# Patient Record
Sex: FEMALE | URBAN - METROPOLITAN AREA
[De-identification: names, ages, dates, MRNs, and addresses within clinical notes are randomized per-mention and may not be internally consistent; named-entity substitution may affect disease eponyms.]

---

## 2018-10-19 ENCOUNTER — APPOINTMENT (RX ONLY)
Dept: URBAN - METROPOLITAN AREA CLINIC 156 | Facility: CLINIC | Age: 28
Setting detail: DERMATOLOGY
End: 2018-10-19

## 2018-10-19 DIAGNOSIS — B35.4 TINEA CORPORIS: ICD-10-CM

## 2018-10-19 DIAGNOSIS — L663 OTHER SPECIFIED DISEASES OF HAIR AND HAIR FOLLICLES: ICD-10-CM | Status: RESOLVING

## 2018-10-19 DIAGNOSIS — L738 OTHER SPECIFIED DISEASES OF HAIR AND HAIR FOLLICLES: ICD-10-CM | Status: RESOLVING

## 2018-10-19 DIAGNOSIS — L73.9 FOLLICULAR DISORDER, UNSPECIFIED: ICD-10-CM | Status: RESOLVING

## 2018-10-19 PROBLEM — L02.426 FURUNCLE OF LEFT LOWER LIMB: Status: ACTIVE | Noted: 2018-10-19

## 2018-10-19 PROCEDURE — ? COUNSELING

## 2018-10-19 PROCEDURE — 87220 TISSUE EXAM FOR FUNGI: CPT

## 2018-10-19 PROCEDURE — ? KOH PREP

## 2018-10-19 PROCEDURE — ? OTHER

## 2018-10-19 PROCEDURE — ? PRESCRIPTION

## 2018-10-19 PROCEDURE — 99202 OFFICE O/P NEW SF 15 MIN: CPT

## 2018-10-19 RX ORDER — NAFTIFINE HYDROCHLORIDE 10 MG/2G
CREAM TOPICAL ONCE A DAY
Qty: 1 | Refills: 1 | Status: ERX | COMMUNITY
Start: 2018-10-19

## 2018-10-19 RX ADMIN — NAFTIFINE HYDROCHLORIDE: 10 CREAM TOPICAL at 21:18

## 2018-10-19 ASSESSMENT — LOCATION SIMPLE DESCRIPTION DERM
LOCATION SIMPLE: LEFT FOREARM
LOCATION SIMPLE: LEFT POSTERIOR THIGH

## 2018-10-19 ASSESSMENT — LOCATION DETAILED DESCRIPTION DERM
LOCATION DETAILED: LEFT PROXIMAL DORSAL FOREARM
LOCATION DETAILED: LEFT PROXIMAL POSTERIOR THIGH

## 2018-10-19 ASSESSMENT — LOCATION ZONE DERM
LOCATION ZONE: ARM
LOCATION ZONE: LEG

## 2018-10-19 NOTE — HPI: RASH
How Severe Is Your Rash?: moderate
Is This A New Presentation, Or A Follow-Up?: Rash
Additional History: brother had ringworm recently

## 2018-10-19 NOTE — PROCEDURE: KOH PREP
Showing: positive findings within normal realm
Detail Level: Detailed
Koh Procedure Text (Tissue Harvesting Technique): A 15-blade scalpel was used to scrape the skin. The skin scrapings were placed on a glass slide, covered with a coverslip and a KOH solution was applied.
Koh Intro Text (From The.....): A KOH prep was ordered and evaluated from the

## 2018-10-19 NOTE — PROCEDURE: OTHER
Other (Free Text): if medication is not covered by insurance she will use OTC antifungals.
Detail Level: Zone
Note Text (......Xxx Chief Complaint.): This diagnosis correlates with the

## 2018-10-19 NOTE — HPI: SKIN LESION
How Severe Is Your Skin Lesion?: severe
Has Your Skin Lesion Been Treated?: not been treated
Is This A New Presentation, Or A Follow-Up?: Skin Lesion
Additional History: patient states she took a warm bath and it drained a little fluid and then was smaller.

## 2018-11-21 ENCOUNTER — RX ONLY (OUTPATIENT)
Age: 28
Setting detail: RX ONLY
End: 2018-11-21

## 2018-11-21 RX ORDER — ECONAZOLE NITRATE 10 MG/G
CREAM TOPICAL
Qty: 1 | Refills: 1 | Status: ERX | COMMUNITY
Start: 2018-11-21

## 2019-03-02 ENCOUNTER — APPOINTMENT (RX ONLY)
Dept: URBAN - METROPOLITAN AREA CLINIC 154 | Facility: CLINIC | Age: 29
Setting detail: DERMATOLOGY
End: 2019-03-02

## 2019-03-02 DIAGNOSIS — Z41.9 ENCOUNTER FOR PROCEDURE FOR PURPOSES OTHER THAN REMEDYING HEALTH STATE, UNSPECIFIED: ICD-10-CM

## 2019-03-02 PROCEDURE — ? FILLERS

## 2019-03-02 NOTE — PROCEDURE: FILLERS
Additional Area 4 Volume In Cc: 0
Post-Care Instructions: Patient instructed to apply ice to reduce swelling.
Filler: Juvederm Ultra Plus XC
Detail Level: Simple
Include Cannula Information In Note?: No
Use Map Statement For Sites (Optional): Yes
Lot #: [id number]
Lot #: [id number]
Vermilion Lips Filler Volume In Cc: 1
Map Statment: See Attach Map for Complete Details
Expiration Date (Month Year): 03/05/20
Expiration Date (Month Year): 08/04/19
Additional Area 1 Location: UPPER CLAUDE ORAL
Consent: Written consent obtained. Risks include but not limited to bruising, beading, irregular texture, ulceration, infection, allergic reaction, scar formation, incomplete augmentation, temporary nature, procedural pain.
Topical Anesthesia?: 23% lidocaine, 7% tetracaine

## 2020-03-25 ENCOUNTER — APPOINTMENT (RX ONLY)
Dept: URBAN - METROPOLITAN AREA CLINIC 157 | Facility: CLINIC | Age: 30
Setting detail: DERMATOLOGY
End: 2020-03-25

## 2020-03-25 VITALS — WEIGHT: 125 LBS

## 2020-03-25 DIAGNOSIS — J02.9 ACUTE PHARYNGITIS, UNSPECIFIED: ICD-10-CM

## 2020-03-25 PROCEDURE — ? COUNSELING

## 2020-03-25 PROCEDURE — 99212 OFFICE O/P EST SF 10 MIN: CPT

## 2020-03-25 PROCEDURE — ? TREATMENT REGIMEN

## 2020-03-25 PROCEDURE — ? PRESCRIPTION

## 2020-03-25 ASSESSMENT — LOCATION DETAILED DESCRIPTION DERM: LOCATION DETAILED: OROPHARYNX

## 2020-03-25 ASSESSMENT — LOCATION ZONE DERM: LOCATION ZONE: MUCOUS_MEMBRANE

## 2020-03-25 ASSESSMENT — LOCATION SIMPLE DESCRIPTION DERM: LOCATION SIMPLE: OROPHARYNX

## 2020-03-25 NOTE — PROCEDURE: TREATMENT REGIMEN
Initiate Treatment: Amoxicillin Tid \\nSalt water gargles qid\\nMotrin 2 tsp/Benadryl 1 tsp qid gargle and swallow
Detail Level: Simple

## 2020-03-25 NOTE — PROCEDURE: MIPS QUALITY
Quality 110: Preventive Care And Screening: Influenza Immunization: Influenza Immunization Administered during Influenza season
Quality 154 Part A: Falls: Risk Assessment (Should Be Reported With Measure 155.): Falls risk assessment completed and documented in the past 12 months.
Detail Level: Detailed
Quality 131: Pain Assessment And Follow-Up: Pain assessment using a standardized tool is documented as negative, no follow-up plan required

## 2020-04-01 ENCOUNTER — APPOINTMENT (RX ONLY)
Dept: URBAN - METROPOLITAN AREA CLINIC 156 | Facility: CLINIC | Age: 30
Setting detail: DERMATOLOGY
End: 2020-04-01

## 2020-04-01 DIAGNOSIS — N39.0 URINARY TRACT INFECTION, SITE NOT SPECIFIED: ICD-10-CM

## 2020-04-01 PROCEDURE — ? ORDER TESTS

## 2020-04-01 NOTE — PROCEDURE: ORDER TESTS
Billing Type: Third-Party Bill
Bill For Surgical Tray: no
Expected Date Of Service: 04/01/2020
Performing Laboratory: [phone]

## 2025-01-11 ENCOUNTER — APPOINTMENT (OUTPATIENT)
Dept: URBAN - METROPOLITAN AREA CLINIC 154 | Facility: CLINIC | Age: 35
Setting detail: DERMATOLOGY
End: 2025-01-11

## 2025-01-11 DIAGNOSIS — Z41.9 ENCOUNTER FOR PROCEDURE FOR PURPOSES OTHER THAN REMEDYING HEALTH STATE, UNSPECIFIED: ICD-10-CM

## 2025-01-11 PROCEDURE — ? ALPHARET CHEMICAL PEEL

## 2025-01-11 NOTE — PROCEDURE: ALPHARET CHEMICAL PEEL
Detail Level: Zone
Post-Care Instructions: I reviewed with the patient in detail post-care instructions. Patient should avoid sun exposure and wear sun protection.
Prep: The treated area was degreased with pre-peel cleanser, and vaseline was applied for protection of mucous membranes.
Treatment Number: 1
Chemical Peel: 50% AlphaRet Peel
Consent: Prior to the procedure, written consent was obtained and risks were reviewed, including but not limited to: redness, peeling, blistering, pigmentary change, scarring, infection, and pain.
Post Peel Care: After the procedure, a post-peel cream was applied to the treated areas. Sun protection and post-care instructions were reviewed with the patient.